# Patient Record
Sex: FEMALE | Race: WHITE | Employment: FULL TIME | URBAN - METROPOLITAN AREA
[De-identification: names, ages, dates, MRNs, and addresses within clinical notes are randomized per-mention and may not be internally consistent; named-entity substitution may affect disease eponyms.]

---

## 2017-06-05 ENCOUNTER — TELEPHONE (OUTPATIENT)
Dept: OBGYN CLINIC | Facility: CLINIC | Age: 32
End: 2017-06-05

## 2017-06-05 NOTE — TELEPHONE ENCOUNTER
SUSAN please call pt to assist with scheduling next annual appt. Last annual was 4/11/16. Once appt is scheduled send to nurses to address refill. Thanks.

## 2017-06-06 RX ORDER — NORGESTIMATE-ETHINYL ESTRADIOL 7DAYSX3 28
TABLET ORAL
Qty: 84 TABLET | Refills: 0 | Status: SHIPPED | OUTPATIENT
Start: 2017-06-06 | End: 2017-07-20 | Stop reason: ALTCHOICE

## 2017-06-20 NOTE — TELEPHONE ENCOUNTER
Pt called in stating she is leaving early tomorrow morning for vacation and realized she will not have enough of this medication on her trip. Pt is asking if she can have a refill today for this medication, please?

## 2017-06-22 RX ORDER — ESCITALOPRAM OXALATE 10 MG/1
TABLET ORAL
Qty: 90 TABLET | Refills: 0 | Status: SHIPPED | OUTPATIENT
Start: 2017-06-22 | End: 2017-09-24

## 2017-07-20 ENCOUNTER — OFFICE VISIT (OUTPATIENT)
Dept: OBGYN CLINIC | Facility: CLINIC | Age: 32
End: 2017-07-20

## 2017-07-20 VITALS
DIASTOLIC BLOOD PRESSURE: 72 MMHG | HEART RATE: 81 BPM | SYSTOLIC BLOOD PRESSURE: 105 MMHG | BODY MASS INDEX: 24 KG/M2 | WEIGHT: 158.81 LBS

## 2017-07-20 DIAGNOSIS — N92.1 IRREGULAR INTERMENSTRUAL BLEEDING: Primary | ICD-10-CM

## 2017-07-20 DIAGNOSIS — Z30.09 ENCOUNTER FOR COUNSELING REGARDING CONTRACEPTION: ICD-10-CM

## 2017-07-20 PROCEDURE — 99213 OFFICE O/P EST LOW 20 MIN: CPT | Performed by: CLINICAL NURSE SPECIALIST

## 2017-07-24 ENCOUNTER — TELEPHONE (OUTPATIENT)
Dept: FAMILY MEDICINE CLINIC | Facility: CLINIC | Age: 32
End: 2017-07-24

## 2017-07-24 NOTE — TELEPHONE ENCOUNTER
Actions Requested: Patient requested appointment today--no same day sick--declined other providers. May we use same day sick or MD approval? Next day next Monday 7/31/17 per patient availability.   Problem: faiting  Onset and Timing: Saturday 7/22/17 x 3, n congestive heart failure  * Occurred during exercise  * Any head or face injury  * Age > 50 years  * Drinking very little and has signs of dehydration (e.g., no urine > 12 hours, very dry mouth)  * Fainted 2 times in one day  * Patient sounds very sick or

## 2017-07-24 NOTE — PROGRESS NOTES
Jordana Pan is a 36year old female  No LMP recorded. Patient presents with:  Gyn Exam: Spotting started 3 months ago  Here with c/o spotting/bleeding the week before period for the last 3 months. Denies any missed or late pills.  Has been on same OC Narrative   None on file       MEDICATIONS:    Current Outpatient Prescriptions:   •  norgestrel-ethinyl estradiol 0.3-30 MG-MCG Oral Tab, Take 1 tablet by mouth daily. , Disp: 3 Package, Rfl: 0  •  ESCITALOPRAM 10 MG Oral Tab, TAKE 1 TABLET(10 MG) BY MOUTH mouth daily. Encounter for counseling regarding contraception  -     norgestrel-ethinyl estradiol 0.3-30 MG-MCG Oral Tab; Take 1 tablet by mouth daily.           Signed Prescriptions Disp Refills    norgestrel-ethinyl estradiol 0.3-30 MG-MCG Oral Tab 3 P

## 2017-07-31 ENCOUNTER — OFFICE VISIT (OUTPATIENT)
Dept: INTERNAL MEDICINE CLINIC | Facility: CLINIC | Age: 32
End: 2017-07-31

## 2017-07-31 VITALS
BODY MASS INDEX: 24 KG/M2 | WEIGHT: 159 LBS | RESPIRATION RATE: 16 BRPM | HEART RATE: 73 BPM | SYSTOLIC BLOOD PRESSURE: 116 MMHG | DIASTOLIC BLOOD PRESSURE: 77 MMHG

## 2017-07-31 DIAGNOSIS — R55 SYNCOPE, UNSPECIFIED SYNCOPE TYPE: Primary | ICD-10-CM

## 2017-07-31 PROCEDURE — 99212 OFFICE O/P EST SF 10 MIN: CPT | Performed by: INTERNAL MEDICINE

## 2017-07-31 PROCEDURE — 99214 OFFICE O/P EST MOD 30 MIN: CPT | Performed by: INTERNAL MEDICINE

## 2017-07-31 RX ORDER — DEXTROAMPHETAMINE SACCHARATE, AMPHETAMINE ASPARTATE MONOHYDRATE, DEXTROAMPHETAMINE SULFATE AND AMPHETAMINE SULFATE 3.75; 3.75; 3.75; 3.75 MG/1; MG/1; MG/1; MG/1
15 CAPSULE, EXTENDED RELEASE ORAL DAILY
Qty: 30 CAPSULE | Refills: 0 | Status: SHIPPED | OUTPATIENT
Start: 2017-08-30 | End: 2017-12-13

## 2017-07-31 RX ORDER — DEXTROAMPHETAMINE SACCHARATE, AMPHETAMINE ASPARTATE MONOHYDRATE, DEXTROAMPHETAMINE SULFATE AND AMPHETAMINE SULFATE 3.75; 3.75; 3.75; 3.75 MG/1; MG/1; MG/1; MG/1
15 CAPSULE, EXTENDED RELEASE ORAL DAILY
Qty: 30 CAPSULE | Refills: 0 | Status: SHIPPED | OUTPATIENT
Start: 2017-07-31 | End: 2017-12-13

## 2017-07-31 RX ORDER — DEXTROAMPHETAMINE SACCHARATE, AMPHETAMINE ASPARTATE MONOHYDRATE, DEXTROAMPHETAMINE SULFATE AND AMPHETAMINE SULFATE 3.75; 3.75; 3.75; 3.75 MG/1; MG/1; MG/1; MG/1
15 CAPSULE, EXTENDED RELEASE ORAL DAILY
Qty: 30 CAPSULE | Refills: 0 | Status: SHIPPED | OUTPATIENT
Start: 2017-09-29 | End: 2017-12-13

## 2017-08-03 ENCOUNTER — LAB ENCOUNTER (OUTPATIENT)
Dept: LAB | Facility: HOSPITAL | Age: 32
End: 2017-08-03
Attending: INTERNAL MEDICINE
Payer: COMMERCIAL

## 2017-08-03 DIAGNOSIS — R55 SYNCOPE, UNSPECIFIED SYNCOPE TYPE: ICD-10-CM

## 2017-08-03 LAB
ALBUMIN SERPL BCP-MCNC: 3.9 G/DL (ref 3.5–4.8)
ALBUMIN/GLOB SERPL: 1.3 {RATIO} (ref 1–2)
ALP SERPL-CCNC: 50 U/L (ref 32–100)
ALT SERPL-CCNC: 26 U/L (ref 14–54)
ANION GAP SERPL CALC-SCNC: 8 MMOL/L (ref 0–18)
AST SERPL-CCNC: 23 U/L (ref 15–41)
BASOPHILS # BLD: 0 K/UL (ref 0–0.2)
BASOPHILS NFR BLD: 0 %
BILIRUB SERPL-MCNC: 0.8 MG/DL (ref 0.3–1.2)
BILIRUB UR QL: NEGATIVE
BUN SERPL-MCNC: 9 MG/DL (ref 8–20)
BUN/CREAT SERPL: 12.5 (ref 10–20)
CALCIUM SERPL-MCNC: 9.3 MG/DL (ref 8.5–10.5)
CHLORIDE SERPL-SCNC: 105 MMOL/L (ref 95–110)
CHOLEST SERPL-MCNC: 173 MG/DL (ref 110–200)
CO2 SERPL-SCNC: 24 MMOL/L (ref 22–32)
COLOR UR: YELLOW
CREAT SERPL-MCNC: 0.72 MG/DL (ref 0.5–1.5)
EOSINOPHIL # BLD: 0.2 K/UL (ref 0–0.7)
EOSINOPHIL NFR BLD: 2 %
ERYTHROCYTE [DISTWIDTH] IN BLOOD BY AUTOMATED COUNT: 13.6 % (ref 11–15)
GLOBULIN PLAS-MCNC: 2.9 G/DL (ref 2.5–3.7)
GLUCOSE SERPL-MCNC: 88 MG/DL (ref 70–99)
GLUCOSE UR-MCNC: NEGATIVE MG/DL
HCT VFR BLD AUTO: 41.5 % (ref 35–48)
HDLC SERPL-MCNC: 48 MG/DL
HGB BLD-MCNC: 13.9 G/DL (ref 12–16)
HGB UR QL STRIP.AUTO: NEGATIVE
LDLC SERPL CALC-MCNC: 104 MG/DL (ref 0–99)
LYMPHOCYTES # BLD: 1.9 K/UL (ref 1–4)
LYMPHOCYTES NFR BLD: 23 %
MCH RBC QN AUTO: 29.2 PG (ref 27–32)
MCHC RBC AUTO-ENTMCNC: 33.5 G/DL (ref 32–37)
MCV RBC AUTO: 87 FL (ref 80–100)
MONOCYTES # BLD: 0.7 K/UL (ref 0–1)
MONOCYTES NFR BLD: 8 %
NEUTROPHILS # BLD AUTO: 5.5 K/UL (ref 1.8–7.7)
NEUTROPHILS NFR BLD: 66 %
NITRITE UR QL STRIP.AUTO: NEGATIVE
NONHDLC SERPL-MCNC: 125 MG/DL
OSMOLALITY UR CALC.SUM OF ELEC: 282 MOSM/KG (ref 275–295)
PH UR: 6 [PH] (ref 5–8)
PLATELET # BLD AUTO: 217 K/UL (ref 140–400)
PMV BLD AUTO: 9.4 FL (ref 7.4–10.3)
POTASSIUM SERPL-SCNC: 3.3 MMOL/L (ref 3.3–5.1)
PROT SERPL-MCNC: 6.8 G/DL (ref 5.9–8.4)
PROT UR-MCNC: NEGATIVE MG/DL
RBC # BLD AUTO: 4.77 M/UL (ref 3.7–5.4)
RBC #/AREA URNS AUTO: 1 /HPF
SODIUM SERPL-SCNC: 137 MMOL/L (ref 136–144)
SP GR UR STRIP: 1.01 (ref 1–1.03)
TRIGL SERPL-MCNC: 107 MG/DL (ref 1–149)
TSH SERPL-ACNC: 2.24 UIU/ML (ref 0.45–5.33)
UROBILINOGEN UR STRIP-ACNC: <2
VIT C UR-MCNC: NEGATIVE MG/DL
WBC # BLD AUTO: 8.2 K/UL (ref 4–11)
WBC #/AREA URNS AUTO: 11 /HPF

## 2017-08-03 PROCEDURE — 80061 LIPID PANEL: CPT

## 2017-08-03 PROCEDURE — 85025 COMPLETE CBC W/AUTO DIFF WBC: CPT

## 2017-08-03 PROCEDURE — 80053 COMPREHEN METABOLIC PANEL: CPT

## 2017-08-03 PROCEDURE — 81001 URINALYSIS AUTO W/SCOPE: CPT

## 2017-08-03 PROCEDURE — 36415 COLL VENOUS BLD VENIPUNCTURE: CPT

## 2017-08-03 PROCEDURE — 84443 ASSAY THYROID STIM HORMONE: CPT

## 2017-08-09 ENCOUNTER — OFFICE VISIT (OUTPATIENT)
Dept: OBGYN CLINIC | Facility: CLINIC | Age: 32
End: 2017-08-09

## 2017-08-09 VITALS
WEIGHT: 155 LBS | BODY MASS INDEX: 24 KG/M2 | HEART RATE: 68 BPM | DIASTOLIC BLOOD PRESSURE: 66 MMHG | SYSTOLIC BLOOD PRESSURE: 94 MMHG

## 2017-08-09 DIAGNOSIS — Z11.3 SCREENING FOR STD (SEXUALLY TRANSMITTED DISEASE): ICD-10-CM

## 2017-08-09 DIAGNOSIS — Z76.0 MEDICATION REFILL: ICD-10-CM

## 2017-08-09 DIAGNOSIS — N88.8 FRIABLE CERVIX: ICD-10-CM

## 2017-08-09 DIAGNOSIS — Z01.419 WELL WOMAN EXAM WITH ROUTINE GYNECOLOGICAL EXAM: Primary | ICD-10-CM

## 2017-08-09 DIAGNOSIS — Z12.4 SCREENING FOR MALIGNANT NEOPLASM OF CERVIX: ICD-10-CM

## 2017-08-09 PROCEDURE — 99395 PREV VISIT EST AGE 18-39: CPT | Performed by: CLINICAL NURSE SPECIALIST

## 2017-08-11 LAB
C TRACH DNA SPEC QL NAA+PROBE: NEGATIVE
N GONORRHOEA DNA SPEC QL NAA+PROBE: NEGATIVE
T VAGINALIS RRNA SPEC QL NAA+PROBE: NEGATIVE

## 2017-08-12 ENCOUNTER — TELEPHONE (OUTPATIENT)
Dept: INTERNAL MEDICINE CLINIC | Facility: CLINIC | Age: 32
End: 2017-08-12

## 2017-08-14 NOTE — PROGRESS NOTES
Idalmis Carrasco is a 36year old female [facility] Patient's last menstrual period was 07/23/2017. Patient presents with:  Gyn Exam: Annual  CAP pt. Last annual was 4/2016.  I saw this pt last month for irregular bleeding the week before period on OCP and marylu chapa norgestrel-ethinyl estradiol 0.3-30 MG-MCG Oral Tab, Take 1 tablet by mouth daily. , Disp: 3 Package, Rfl: 0  •  ClonazePAM 0.5 MG Oral Tab, Take 1 tablet (0.5 mg total) by mouth nightly as needed for Anxiety. , Disp: 30 tablet, Rfl: 0  •  Amphetamine-Dextro skin changes  Abdomen:  soft, nontender, nondistended, no masses  Skin/Hair: no unusual rashes or bruises  Extremities: no edema, no cyanosis  Psychiatric:  Oriented to time, place, person and situation.  Appropriate mood and affect    Pelvic Exam:  Externa

## 2017-08-15 NOTE — TELEPHONE ENCOUNTER
No cause for syncope seen in labs but abnormalities if they were seen could explain why syncope could occur. Most often causes for syncope are not found and proper hydration is advised but blood work is necessary to check out.  If syncope recurs cardiac mandy

## 2017-08-15 NOTE — TELEPHONE ENCOUNTER
Patient informed of results (identified name and ) and recommendations, as per Dr. Joelle Goldsmith result note copied below. Pt denies urinary symptoms.  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

## 2017-09-27 RX ORDER — ESCITALOPRAM OXALATE 10 MG/1
TABLET ORAL
Qty: 90 TABLET | Refills: 0 | Status: SHIPPED | OUTPATIENT
Start: 2017-09-27 | End: 2017-12-27

## 2017-09-28 NOTE — TELEPHONE ENCOUNTER
Refill Protocol Appointment Criteria  · Appointment scheduled in the past 12 months or in the next 3 months  Recent Outpatient Visits            1 month ago Well woman exam with routine gynecological exam    [facility], [address], [place]

## 2017-10-09 DIAGNOSIS — N92.1 IRREGULAR INTERMENSTRUAL BLEEDING: ICD-10-CM

## 2017-10-09 DIAGNOSIS — Z30.09 ENCOUNTER FOR COUNSELING REGARDING CONTRACEPTION: ICD-10-CM

## 2017-10-10 ENCOUNTER — TELEPHONE (OUTPATIENT)
Dept: OBGYN CLINIC | Facility: CLINIC | Age: 32
End: 2017-10-10

## 2017-10-10 RX ORDER — NORGESTREL AND ETHINYL ESTRADIOL 0.3-0.03MG
1 KIT ORAL DAILY
Qty: 84 TABLET | Refills: 3 | Status: SHIPPED | OUTPATIENT
Start: 2017-10-10 | End: 2018-09-04

## 2017-10-10 NOTE — TELEPHONE ENCOUNTER
Pharmacy said pt needed approval for refill on her birth control. Already missed 2 days of her birth control. Pt stated its ok to leave a detailed voice message.

## 2017-10-10 NOTE — TELEPHONE ENCOUNTER
Tasked to CAP on call to approve rx. Pt was seen by MAF on 8/9/17 for annual but did not get rx refilled. Med pended. Pap negative 8/9/17.

## 2017-12-13 NOTE — TELEPHONE ENCOUNTER
LOV: 7/31/17  Last Rx: 9/29/17    Please advise in regards to refill request. Thank You    Please respond to pool: [facility] LPN/JEFFREY

## 2017-12-13 NOTE — TELEPHONE ENCOUNTER
Pt requesting refill for Amphetamine-Dextroamphet ER (ADDERALL XR) 15 MG Oral Capsule SR 24 Hr    Pt is currently out of medication

## 2017-12-14 RX ORDER — DEXTROAMPHETAMINE SACCHARATE, AMPHETAMINE ASPARTATE MONOHYDRATE, DEXTROAMPHETAMINE SULFATE AND AMPHETAMINE SULFATE 3.75; 3.75; 3.75; 3.75 MG/1; MG/1; MG/1; MG/1
15 CAPSULE, EXTENDED RELEASE ORAL DAILY
Qty: 30 CAPSULE | Refills: 0 | Status: SHIPPED | OUTPATIENT
Start: 2017-12-14 | End: 2018-01-13

## 2017-12-14 RX ORDER — DEXTROAMPHETAMINE SACCHARATE, AMPHETAMINE ASPARTATE MONOHYDRATE, DEXTROAMPHETAMINE SULFATE AND AMPHETAMINE SULFATE 3.75; 3.75; 3.75; 3.75 MG/1; MG/1; MG/1; MG/1
15 CAPSULE, EXTENDED RELEASE ORAL DAILY
Qty: 30 CAPSULE | Refills: 0 | Status: SHIPPED | OUTPATIENT
Start: 2018-01-13 | End: 2018-02-12

## 2017-12-14 RX ORDER — DEXTROAMPHETAMINE SACCHARATE, AMPHETAMINE ASPARTATE MONOHYDRATE, DEXTROAMPHETAMINE SULFATE AND AMPHETAMINE SULFATE 3.75; 3.75; 3.75; 3.75 MG/1; MG/1; MG/1; MG/1
15 CAPSULE, EXTENDED RELEASE ORAL DAILY
Qty: 30 CAPSULE | Refills: 0 | Status: SHIPPED | OUTPATIENT
Start: 2018-02-13 | End: 2018-03-15

## 2017-12-14 NOTE — TELEPHONE ENCOUNTER
If patient returns call please let her know that script is ready for  at the [facility] OF THE [facility] desk

## 2017-12-29 RX ORDER — ESCITALOPRAM OXALATE 10 MG/1
TABLET ORAL
Qty: 90 TABLET | Refills: 0 | Status: SHIPPED | OUTPATIENT
Start: 2017-12-29 | End: 2018-07-16

## 2018-02-18 ENCOUNTER — HOSPITAL ENCOUNTER (OUTPATIENT)
Age: 33
Discharge: HOME | End: 2018-02-18
Attending: FAMILY MEDICINE
Payer: COMMERCIAL

## 2018-02-18 VITALS
OXYGEN SATURATION: 100 % | SYSTOLIC BLOOD PRESSURE: 122 MMHG | TEMPERATURE: 99 F | HEIGHT: 64 IN | BODY MASS INDEX: 23.05 KG/M2 | HEART RATE: 115 BPM | DIASTOLIC BLOOD PRESSURE: 64 MMHG | RESPIRATION RATE: 20 BRPM | WEIGHT: 135 LBS

## 2018-02-18 DIAGNOSIS — J20.9 ACUTE BRONCHITIS, UNSPECIFIED ORGANISM: Primary | ICD-10-CM

## 2018-02-18 LAB — S PYO AG THROAT QL: NEGATIVE

## 2018-02-18 PROCEDURE — 99213 OFFICE O/P EST LOW 20 MIN: CPT

## 2018-02-18 PROCEDURE — 99202 OFFICE O/P NEW SF 15 MIN: CPT

## 2018-02-18 PROCEDURE — 87430 STREP A AG IA: CPT

## 2018-02-18 RX ORDER — PREDNISONE 20 MG/1
40 TABLET ORAL DAILY
Qty: 10 TABLET | Refills: 0 | Status: SHIPPED | OUTPATIENT
Start: 2018-02-18 | End: 2018-02-23

## 2018-02-18 RX ORDER — PREDNISONE 20 MG/1
40 TABLET ORAL DAILY
Qty: 10 TABLET | Refills: 0 | Status: SHIPPED | OUTPATIENT
Start: 2018-02-18 | End: 2018-02-18

## 2018-02-18 RX ORDER — ALBUTEROL SULFATE 90 UG/1
2 AEROSOL, METERED RESPIRATORY (INHALATION) EVERY 4 HOURS PRN
Qty: 1 INHALER | Refills: 0 | Status: SHIPPED | OUTPATIENT
Start: 2018-02-18 | End: 2018-02-25

## 2018-02-18 NOTE — ED PROVIDER NOTES
Patient presents with:  Cough/URI    HPI:     John Crenshaw is a 29year old female who presents with for chief complaint of fevers, chills, chest congestion, cough, headaches and body aches. Onset of symptoms was 3 days  Since he is she notes wheeze.    The reviewed and negative except as noted above. PSFH elements reviewed from today and agreed except as otherwise stated in HPI.         Physical Exam:     Findings:    /64   Pulse 115   Temp 98.6 °F (37 °C) (Oral)   Resp 20   Ht 162.6 cm (5' 4\")   Wt relief  Monitor for worsening symptoms   Follow up with pcp if not better in 3-4 days or as needed    All results reviewed and discussed with patient. See AVS for detailed discharge instructions for your condition today.     Follow Up with:  Jaydon Schwartz

## 2018-02-18 NOTE — ED INITIAL ASSESSMENT (HPI)
Patient comes in with upper respiratory symptoms, cough, congestion, fever as high as 100.3, hoarse throat, body aches since Friday. Afebrile today.

## 2018-04-03 ENCOUNTER — TELEPHONE (OUTPATIENT)
Dept: INTERNAL MEDICINE CLINIC | Facility: CLINIC | Age: 33
End: 2018-04-03

## 2018-04-03 NOTE — TELEPHONE ENCOUNTER
Pt called stating she is leaving the country and going to [facility] and [facility]   Pt needs vaccines prior to leaving   She knows she needs hepatis A and typhoid   Pt does not know if she needs any other ones

## 2018-04-08 RX ORDER — DEXTROAMPHETAMINE SACCHARATE, AMPHETAMINE ASPARTATE MONOHYDRATE, DEXTROAMPHETAMINE SULFATE AND AMPHETAMINE SULFATE 3.75; 3.75; 3.75; 3.75 MG/1; MG/1; MG/1; MG/1
15 CAPSULE, EXTENDED RELEASE ORAL DAILY
Qty: 30 CAPSULE | Refills: 0 | Status: SHIPPED | OUTPATIENT
Start: 2018-05-07 | End: 2018-08-30

## 2018-04-08 RX ORDER — DEXTROAMPHETAMINE SACCHARATE, AMPHETAMINE ASPARTATE MONOHYDRATE, DEXTROAMPHETAMINE SULFATE AND AMPHETAMINE SULFATE 3.75; 3.75; 3.75; 3.75 MG/1; MG/1; MG/1; MG/1
15 CAPSULE, EXTENDED RELEASE ORAL DAILY
Qty: 30 CAPSULE | Refills: 0 | Status: SHIPPED | OUTPATIENT
Start: 2018-06-06 | End: 2018-08-30

## 2018-04-08 RX ORDER — DEXTROAMPHETAMINE SACCHARATE, AMPHETAMINE ASPARTATE MONOHYDRATE, DEXTROAMPHETAMINE SULFATE AND AMPHETAMINE SULFATE 3.75; 3.75; 3.75; 3.75 MG/1; MG/1; MG/1; MG/1
15 CAPSULE, EXTENDED RELEASE ORAL DAILY
Qty: 30 CAPSULE | Refills: 0 | Status: SHIPPED | OUTPATIENT
Start: 2018-04-08 | End: 2018-08-30

## 2018-05-22 ENCOUNTER — TELEPHONE (OUTPATIENT)
Dept: INTERNAL MEDICINE CLINIC | Facility: CLINIC | Age: 33
End: 2018-05-22

## 2018-05-29 ENCOUNTER — NURSE ONLY (OUTPATIENT)
Dept: INTERNAL MEDICINE CLINIC | Facility: CLINIC | Age: 33
End: 2018-05-29

## 2018-05-29 ENCOUNTER — TELEPHONE (OUTPATIENT)
Dept: OTHER | Age: 33
End: 2018-05-29

## 2018-05-29 DIAGNOSIS — Z00.00 PREVENTATIVE HEALTH CARE: ICD-10-CM

## 2018-05-29 PROCEDURE — 90471 IMMUNIZATION ADMIN: CPT | Performed by: INTERNAL MEDICINE

## 2018-05-29 PROCEDURE — 90632 HEPA VACCINE ADULT IM: CPT | Performed by: INTERNAL MEDICINE

## 2018-05-29 NOTE — TELEPHONE ENCOUNTER
Pt states she went to travel clinic for recommendations for immunizations for travel and was advised she may need tetanus vaccine, wanted to know when she last received vaccine. Reviewed date of last TDaP, Aug 2014 with pt.  She will come today for Hep A as

## 2018-07-16 RX ORDER — ESCITALOPRAM OXALATE 10 MG/1
TABLET ORAL
Qty: 90 TABLET | Refills: 0 | Status: SHIPPED | OUTPATIENT
Start: 2018-07-16 | End: 2018-10-14

## 2018-08-02 DIAGNOSIS — N92.1 IRREGULAR INTERMENSTRUAL BLEEDING: ICD-10-CM

## 2018-08-02 DIAGNOSIS — Z30.09 ENCOUNTER FOR COUNSELING REGARDING CONTRACEPTION: ICD-10-CM

## 2018-08-02 RX ORDER — NORGESTREL AND ETHINYL ESTRADIOL 0.3-0.03MG
1 KIT ORAL DAILY
Qty: 84 TABLET | Refills: 0 | OUTPATIENT
Start: 2018-08-02

## 2018-08-29 ENCOUNTER — NURSE TRIAGE (OUTPATIENT)
Dept: OTHER | Age: 33
End: 2018-08-29

## 2018-08-29 NOTE — TELEPHONE ENCOUNTER
Action Requested: Summary for Provider     []  Critical Lab, Recommendations Needed  [] Need Additional Advice  []   FYI    []   Need Orders  [] Need Medications Sent to Pharmacy  []  Other     SUMMARY: appt scheduled with MISTY BETANCOURT for pain top of rig

## 2018-08-30 NOTE — TELEPHONE ENCOUNTER
NO PROTOCOL, 90 day panel pended for approval.  Office staff=please call patient once scripts are ready for .

## 2018-08-30 NOTE — TELEPHONE ENCOUNTER
Pt called in requesting a refill for the following medication:  Please advise       Amphetamine-Dextroamphet ER (ADDERALL XR) 15 MG Oral Capsule SR 24 Hr 30 capsule 0 6/6/2018 7/6/2018   Sig :  Take 1 capsule (15 mg total) by mouth daily.      Route:   Oral

## 2018-09-04 ENCOUNTER — TELEPHONE (OUTPATIENT)
Dept: OBGYN CLINIC | Facility: CLINIC | Age: 33
End: 2018-09-04

## 2018-09-04 DIAGNOSIS — Z30.09 ENCOUNTER FOR COUNSELING REGARDING CONTRACEPTION: ICD-10-CM

## 2018-09-04 DIAGNOSIS — N92.1 IRREGULAR INTERMENSTRUAL BLEEDING: ICD-10-CM

## 2018-09-04 NOTE — TELEPHONE ENCOUNTER
Pharmacy calling to report that pt needs refill on OCP. Explained to pharmacist that pt is due for her annual and pt needs to call office to set up appt before refill can be addressed.

## 2018-09-05 RX ORDER — NORGESTREL AND ETHINYL ESTRADIOL 0.3-0.03MG
1 KIT ORAL DAILY
Qty: 84 TABLET | Refills: 0 | Status: SHIPPED | OUTPATIENT
Start: 2018-09-05 | End: 2018-09-27

## 2018-09-05 RX ORDER — DEXTROAMPHETAMINE SACCHARATE, AMPHETAMINE ASPARTATE MONOHYDRATE, DEXTROAMPHETAMINE SULFATE AND AMPHETAMINE SULFATE 3.75; 3.75; 3.75; 3.75 MG/1; MG/1; MG/1; MG/1
15 CAPSULE, EXTENDED RELEASE ORAL DAILY
Qty: 30 CAPSULE | Refills: 0 | Status: SHIPPED | OUTPATIENT
Start: 2018-09-05 | End: 2018-10-05

## 2018-09-05 RX ORDER — DEXTROAMPHETAMINE SACCHARATE, AMPHETAMINE ASPARTATE MONOHYDRATE, DEXTROAMPHETAMINE SULFATE AND AMPHETAMINE SULFATE 3.75; 3.75; 3.75; 3.75 MG/1; MG/1; MG/1; MG/1
15 CAPSULE, EXTENDED RELEASE ORAL DAILY
Qty: 30 CAPSULE | Refills: 0 | Status: SHIPPED | OUTPATIENT
Start: 2018-10-30 | End: 2018-09-27

## 2018-09-05 RX ORDER — DEXTROAMPHETAMINE SACCHARATE, AMPHETAMINE ASPARTATE MONOHYDRATE, DEXTROAMPHETAMINE SULFATE AND AMPHETAMINE SULFATE 3.75; 3.75; 3.75; 3.75 MG/1; MG/1; MG/1; MG/1
15 CAPSULE, EXTENDED RELEASE ORAL DAILY
Qty: 30 CAPSULE | Refills: 0 | Status: SHIPPED | OUTPATIENT
Start: 2018-09-30 | End: 2018-09-27

## 2018-09-05 NOTE — TELEPHONE ENCOUNTER
LAST ANNUAL 8-9-17 (PAP NORMAL) AND NEXT ANNUAL SCHEDULED FOR 9-19-18. PT NOTIFIED AUTHORIZATION FOR 3 PACKS SENT TO THE PHARMACY PER PROTOCOL.

## 2018-09-12 DIAGNOSIS — N92.1 IRREGULAR INTERMENSTRUAL BLEEDING: ICD-10-CM

## 2018-09-12 DIAGNOSIS — Z30.09 ENCOUNTER FOR COUNSELING REGARDING CONTRACEPTION: ICD-10-CM

## 2018-09-13 ENCOUNTER — TELEPHONE (OUTPATIENT)
Dept: OBGYN CLINIC | Facility: CLINIC | Age: 33
End: 2018-09-13

## 2018-09-13 RX ORDER — NORGESTREL AND ETHINYL ESTRADIOL 0.3-0.03MG
1 KIT ORAL DAILY
Qty: 84 TABLET | Refills: 0 | OUTPATIENT
Start: 2018-09-13

## 2018-09-13 NOTE — TELEPHONE ENCOUNTER
Pharm did not receive electronic fax of Rx on 9/5 for birth control. Pt states that she is very behind. Pt. Requesting to get Rx sent to her [facility].

## 2018-09-13 NOTE — TELEPHONE ENCOUNTER
SENT BACK RX DENIED, RESPONDED TO BY OTHER MEANS AND NOTED THE RX SENT TO THEIR STORE ON 9-5-18 FOR 1 REFILL.

## 2018-09-14 DIAGNOSIS — N92.1 IRREGULAR INTERMENSTRUAL BLEEDING: ICD-10-CM

## 2018-09-14 DIAGNOSIS — Z30.09 ENCOUNTER FOR COUNSELING REGARDING CONTRACEPTION: ICD-10-CM

## 2018-09-14 NOTE — TELEPHONE ENCOUNTER
Gómeztclisa. Called pts [facility] pharmacy that Low-ogestrel was sent to and spoke with Halle. Halle stated they never received a RX. Verbal given to Halle for pts low-ogestrel for 3 packs no refills.

## 2018-09-14 NOTE — TELEPHONE ENCOUNTER
Lmtcb to notify pt OCP rx for 90 day supply was sent to pt's pharmacy on 9/5/18 and should cover pt to appt on 9/19/18.

## 2018-09-18 DIAGNOSIS — Z30.09 ENCOUNTER FOR COUNSELING REGARDING CONTRACEPTION: ICD-10-CM

## 2018-09-18 DIAGNOSIS — N92.1 IRREGULAR INTERMENSTRUAL BLEEDING: ICD-10-CM

## 2018-09-18 RX ORDER — NORGESTREL AND ETHINYL ESTRADIOL 0.3-0.03MG
1 KIT ORAL DAILY
Qty: 84 TABLET | Refills: 0 | OUTPATIENT
Start: 2018-09-18

## 2018-09-18 NOTE — TELEPHONE ENCOUNTER
SEE 9-14-18 PHONE ENCOUNTER. SENT BACK RX DENIED. REFILL AUTHORIZATION WAS SENT TO YOUR PHARMACY ON 9-5-18.

## 2018-09-27 ENCOUNTER — OFFICE VISIT (OUTPATIENT)
Dept: OBGYN CLINIC | Facility: CLINIC | Age: 33
End: 2018-09-27

## 2018-09-27 VITALS
HEART RATE: 69 BPM | WEIGHT: 149.81 LBS | BODY MASS INDEX: 26 KG/M2 | SYSTOLIC BLOOD PRESSURE: 115 MMHG | DIASTOLIC BLOOD PRESSURE: 76 MMHG

## 2018-09-27 DIAGNOSIS — Z01.419 WELL WOMAN EXAM WITH ROUTINE GYNECOLOGICAL EXAM: Primary | ICD-10-CM

## 2018-09-27 DIAGNOSIS — Z76.0 MEDICATION REFILL: ICD-10-CM

## 2018-09-27 PROCEDURE — 99395 PREV VISIT EST AGE 18-39: CPT | Performed by: CLINICAL NURSE SPECIALIST

## 2018-09-27 NOTE — PROGRESS NOTES
Kailash Alanis is a 34year old female [facility] Patient's last menstrual period was 09/27/2018. Patient presents with:  Gyn Exam: Annual  Refill Request: B/C  Last annual exam and pap was 8/9/17. Pap was normal. Due for co-testing next year.  Hx of LEEP in 2008 x 12 years    Other Topics      Concerns:         Service: Not Asked        Blood Transfusions: Not Asked          coffee, 1 cup/day - soda, occasionally        Caffeine Concern: Yes        Occupational Exposure: Not Asked        Hobby Hazards: Not Neurological:  denies headaches, extremity weakness or numbness. Psychiatric: denies depression or anxiety. Endocrine:   denies excessive thirst or urination. Heme/Lymph:  denies history of anemia, easy bruising or bleeding.       PHYSICAL EXAM:   Cons

## 2018-10-04 NOTE — PROGRESS NOTES
Edwin Ontiveros is a 36year old female. HPI:   1. Syncope, unspecified syncope type    Had a syncopal spell and slumped to ground and scaped knees wfile with friends.  [place] herslef falling and no loss of urine, tongue biting or observed tonic clonoc movements a week ago after walking a mile w/o drinking water. Suspect some contribution of dehydration. No evidence of seizure activity. Unlikely to be arrhythmia w/o serious injury on falling but hypotension likely.  Check blood work and see if any cause for syncope No vertebral tenderness/No scoliosis

## 2018-10-15 RX ORDER — ESCITALOPRAM OXALATE 10 MG/1
TABLET ORAL
Qty: 90 TABLET | Refills: 0 | Status: SHIPPED | OUTPATIENT
Start: 2018-10-15 | End: 2019-01-10

## 2019-01-10 RX ORDER — ESCITALOPRAM OXALATE 10 MG/1
TABLET ORAL
Qty: 90 TABLET | Refills: 0 | Status: SHIPPED | OUTPATIENT
Start: 2019-01-10 | End: 2019-04-28

## 2019-01-23 NOTE — TELEPHONE ENCOUNTER
Pt called in requesting refill on medication below. She states that she has 1 more left. Please advise.        Current Outpatient Medications:   •  Amphetamine-Dextroamphet ER (ADDERALL XR) 15 MG Oral Capsule SR 24 Hr

## 2019-01-24 RX ORDER — DEXTROAMPHETAMINE SACCHARATE, AMPHETAMINE ASPARTATE MONOHYDRATE, DEXTROAMPHETAMINE SULFATE AND AMPHETAMINE SULFATE 3.75; 3.75; 3.75; 3.75 MG/1; MG/1; MG/1; MG/1
15 CAPSULE, EXTENDED RELEASE ORAL DAILY
Qty: 30 CAPSULE | Refills: 0 | Status: SHIPPED | OUTPATIENT
Start: 2019-01-24 | End: 2019-02-15

## 2019-01-26 ENCOUNTER — TELEPHONE (OUTPATIENT)
Dept: OTHER | Age: 34
End: 2019-01-26

## 2019-01-26 NOTE — TELEPHONE ENCOUNTER
Pt called and looking for rx ref for adderall. Per EMR rx was printed on 1-24-19 and she states she already received my chart message that its ready for p/u. She just want to know which location she needs to p/u rx.    Triage RN called [facility] and schiller

## 2019-01-28 NOTE — TELEPHONE ENCOUNTER
Patient notified that script is ready for  at the [facility] OF THE [facility]

## 2019-01-28 NOTE — TELEPHONE ENCOUNTER
RX is at [facility] OF THE [facility] internal medicine .  Needs to make appointment in next 30 days

## 2019-02-15 NOTE — PROGRESS NOTES
Bette Torres is a 34year old female. HPI:   1. Attention deficit hyperactivity disorder (ADHD), predominantly inattentive type    Has been taking medicine as advised. Still with trouble with completing tasks and organizational  issues.  Her concentration d unspecified     Clonazepam   • JOSE J III (cervical intraepithelial neoplasia III)    • Depression     Lexapro      Social History:  Social History    Tobacco Use      Smoking status: Never Smoker      Smokeless tobacco: Never Used    Alcohol use: Yes      Co

## 2019-03-26 ENCOUNTER — TELEPHONE (OUTPATIENT)
Dept: INTERNAL MEDICINE CLINIC | Facility: CLINIC | Age: 34
End: 2019-03-26

## 2019-03-26 RX ORDER — DEXTROAMPHETAMINE SACCHARATE, AMPHETAMINE ASPARTATE MONOHYDRATE, DEXTROAMPHETAMINE SULFATE AND AMPHETAMINE SULFATE 3.75; 3.75; 3.75; 3.75 MG/1; MG/1; MG/1; MG/1
15 CAPSULE, EXTENDED RELEASE ORAL DAILY
Qty: 30 CAPSULE | Refills: 0 | Status: SHIPPED | OUTPATIENT
Start: 2019-04-25 | End: 2019-05-25

## 2019-03-26 RX ORDER — DEXTROAMPHETAMINE SACCHARATE, AMPHETAMINE ASPARTATE MONOHYDRATE, DEXTROAMPHETAMINE SULFATE AND AMPHETAMINE SULFATE 3.75; 3.75; 3.75; 3.75 MG/1; MG/1; MG/1; MG/1
15 CAPSULE, EXTENDED RELEASE ORAL DAILY
Qty: 30 CAPSULE | Refills: 0 | Status: SHIPPED | OUTPATIENT
Start: 2019-05-25 | End: 2019-06-24

## 2019-03-26 RX ORDER — DEXTROAMPHETAMINE SACCHARATE, AMPHETAMINE ASPARTATE MONOHYDRATE, DEXTROAMPHETAMINE SULFATE AND AMPHETAMINE SULFATE 3.75; 3.75; 3.75; 3.75 MG/1; MG/1; MG/1; MG/1
15 CAPSULE, EXTENDED RELEASE ORAL DAILY
Qty: 30 CAPSULE | Refills: 0 | Status: SHIPPED | OUTPATIENT
Start: 2019-03-26 | End: 2019-04-25

## 2019-03-26 NOTE — TELEPHONE ENCOUNTER
Pt states she went to refill her medication amphetamine-dextroamphetamine (ADDERALL) 15 MG Oral Tab    But this time is was different per pt she usually gets Adderall ER and this time is was not written as Adderall ER. Pt would like to know if she can go back to the Adderall ER.

## 2019-03-26 NOTE — TELEPHONE ENCOUNTER
Please see email and advise. LOV notes on 2/15/19 show medication as follows:      Will increase  adderall XR 15 mg and see patient back in 3 months

## 2019-03-26 NOTE — TELEPHONE ENCOUNTER
New rx printed and can be picked up. Does she want to  at [facility] or [facility] [place]?

## 2019-04-28 RX ORDER — ESCITALOPRAM OXALATE 10 MG/1
TABLET ORAL
Qty: 90 TABLET | Refills: 1 | Status: SHIPPED | OUTPATIENT
Start: 2019-04-28 | End: 2021-02-17

## 2019-07-31 RX ORDER — DEXTROAMPHETAMINE SACCHARATE, AMPHETAMINE ASPARTATE MONOHYDRATE, DEXTROAMPHETAMINE SULFATE AND AMPHETAMINE SULFATE 3.75; 3.75; 3.75; 3.75 MG/1; MG/1; MG/1; MG/1
15 CAPSULE, EXTENDED RELEASE ORAL DAILY
Qty: 30 CAPSULE | Refills: 0 | Status: CANCELLED | OUTPATIENT
Start: 2019-07-31 | End: 2019-08-30

## 2019-07-31 NOTE — TELEPHONE ENCOUNTER
Controlled medication pending for review. If approved needs to be called in or faxed by on-site staff.     Last Rx: 5/25/19  LOV: 2/15/19    Requested Prescriptions   Pending Prescriptions Disp Refills   • Amphetamine-Dextroamphet ER (ADDERALL XR) 15 MG Or

## 2019-08-01 RX ORDER — DEXTROAMPHETAMINE SACCHARATE, AMPHETAMINE ASPARTATE MONOHYDRATE, DEXTROAMPHETAMINE SULFATE AND AMPHETAMINE SULFATE 3.75; 3.75; 3.75; 3.75 MG/1; MG/1; MG/1; MG/1
15 CAPSULE, EXTENDED RELEASE ORAL DAILY
Qty: 30 CAPSULE | Refills: 0 | Status: SHIPPED | OUTPATIENT
Start: 2019-09-29 | End: 2019-10-29

## 2019-08-01 RX ORDER — DEXTROAMPHETAMINE SACCHARATE, AMPHETAMINE ASPARTATE MONOHYDRATE, DEXTROAMPHETAMINE SULFATE AND AMPHETAMINE SULFATE 3.75; 3.75; 3.75; 3.75 MG/1; MG/1; MG/1; MG/1
15 CAPSULE, EXTENDED RELEASE ORAL DAILY
Qty: 30 CAPSULE | Refills: 0 | Status: SHIPPED | OUTPATIENT
Start: 2019-08-01 | End: 2019-11-20

## 2019-08-01 RX ORDER — DEXTROAMPHETAMINE SACCHARATE, AMPHETAMINE ASPARTATE MONOHYDRATE, DEXTROAMPHETAMINE SULFATE AND AMPHETAMINE SULFATE 3.75; 3.75; 3.75; 3.75 MG/1; MG/1; MG/1; MG/1
15 CAPSULE, EXTENDED RELEASE ORAL DAILY
Qty: 30 CAPSULE | Refills: 0 | Status: SHIPPED | OUTPATIENT
Start: 2019-08-30 | End: 2019-09-29

## 2019-08-01 NOTE — TELEPHONE ENCOUNTER
Ronny will place printed rx at  at [facility], patient contacted aware to  at [place] [address]

## 2019-08-06 NOTE — TELEPHONE ENCOUNTER
Spoke with patient--reports she received [email or web address] message that her Adderall Rx has been denied--wants to clarify. Relayed Cindy's message below and that only duplicate has been denied--patient verbalizes understanding and agreement.  No further questio

## 2019-09-25 ENCOUNTER — TELEPHONE (OUTPATIENT)
Dept: INTERNAL MEDICINE CLINIC | Facility: CLINIC | Age: 34
End: 2019-09-25

## 2019-09-25 DIAGNOSIS — F90.1 ATTENTION DEFICIT HYPERACTIVITY DISORDER (ADHD), PREDOMINANTLY HYPERACTIVE TYPE: ICD-10-CM

## 2019-09-25 DIAGNOSIS — F32.A DEPRESSION, UNSPECIFIED DEPRESSION TYPE: Primary | ICD-10-CM

## 2019-09-25 NOTE — TELEPHONE ENCOUNTER
Per patient the anxiety medication is not working that great for her. She is requesting to see a counselor now if possible? Requesting a referral.     Please advise. Thank you.

## 2019-09-27 NOTE — TELEPHONE ENCOUNTER
Patient calling to check the status on the referral.  She have no one in mind she just trying to get the referral first         Please advise   # [phone]

## 2019-10-01 NOTE — TELEPHONE ENCOUNTER
Dr. Yogi Bingham, patient was last seen on 02/15/2019. Patient has been on Escitalopram which is not working. Requesting a referral for a counselor.  Please advise if ok to generate a referral.

## 2019-10-02 DIAGNOSIS — Z76.0 MEDICATION REFILL: ICD-10-CM

## 2019-10-02 RX ORDER — NORGESTREL AND ETHINYL ESTRADIOL 0.3-0.03MG
KIT ORAL
Qty: 84 TABLET | Refills: 0 | OUTPATIENT
Start: 2019-10-02

## 2019-10-03 DIAGNOSIS — Z76.0 MEDICATION REFILL: ICD-10-CM

## 2019-10-03 RX ORDER — NORGESTREL AND ETHINYL ESTRADIOL 0.3-0.03MG
KIT ORAL
Qty: 84 TABLET | Refills: 0 | OUTPATIENT
Start: 2019-10-03

## 2019-10-03 NOTE — TELEPHONE ENCOUNTER
LM informing pt Rx sent to pharmacy. 1 month OCP sent per protocol.      Last annual= 9/27/18  Next annual= 10/24/19

## 2019-10-03 NOTE — TELEPHONE ENCOUNTER
Patient calling regarding [address] [place] referral, wants to make sure referral covered with insurance. per manage care office patient needs to call [phone]. Called number this is Chhaya Batres number patient may call this number. No paper referral is required.   L

## 2019-10-22 ENCOUNTER — TELEPHONE (OUTPATIENT)
Dept: OBGYN CLINIC | Facility: CLINIC | Age: 34
End: 2019-10-22

## 2019-10-22 NOTE — TELEPHONE ENCOUNTER
Patient unable to come to [facility], so she rescheduled for next evening appt she can book which is schd on 11/14.  Asking if birth control refill can be sent to last until next appt

## 2019-10-22 NOTE — TELEPHONE ENCOUNTER
Pt states that she had to reschedule her appt with [facility]. One rx was sent for birth control previously in 10/2019. Informed will send one more rx and keep her appt with [facility]. Appt 11-14-19. Last Annual 9-12-18, notes state pap & hpv due 2019.

## 2019-11-14 ENCOUNTER — OFFICE VISIT (OUTPATIENT)
Dept: OBGYN CLINIC | Facility: CLINIC | Age: 34
End: 2019-11-14

## 2019-11-14 VITALS
HEART RATE: 73 BPM | WEIGHT: 152.81 LBS | DIASTOLIC BLOOD PRESSURE: 70 MMHG | BODY MASS INDEX: 26 KG/M2 | SYSTOLIC BLOOD PRESSURE: 109 MMHG

## 2019-11-14 DIAGNOSIS — Z98.890 HISTORY OF LOOP ELECTRICAL EXCISION PROCEDURE (LEEP): ICD-10-CM

## 2019-11-14 DIAGNOSIS — Z12.4 CERVICAL CANCER SCREENING: ICD-10-CM

## 2019-11-14 DIAGNOSIS — Z76.0 MEDICATION REFILL: ICD-10-CM

## 2019-11-14 DIAGNOSIS — Z01.419 WELL WOMAN EXAM WITH ROUTINE GYNECOLOGICAL EXAM: Primary | ICD-10-CM

## 2019-11-14 PROCEDURE — 99395 PREV VISIT EST AGE 18-39: CPT | Performed by: CLINICAL NURSE SPECIALIST

## 2019-11-18 NOTE — PROGRESS NOTES
Kristie Trevizo is a 30year old female [place] Patient's last menstrual period was 10/27/2019. Patient presents with:  Gyn Exam: Annual and B/C  Last annual exam was last year. Last pap was 2017 and normal. Will do co-testing today. Hx of LEEP in 2008.  Period connections:        Talks on phone: Not on file        Gets together: Not on file        Attends Qu[employer] service: Not on file        Active member of club or organization: Not on file        Attends meetings of clubs or organizations: Not on file vision  Cardiovascular:  denies chest pain or palpitations  Respiratory:  denies shortness of breath  Gastrointestinal:  denies heartburn, abdominal pain, diarrhea or constipation  Genitourinary:  denies dysuria, incontinence, abnormal vaginal discharge, v exam    Medication refill  -     norgestrel-ethinyl estradiol (LOW-OGESTREL) 0.3-30 MG-MCG Oral Tab; Take 1 tablet by mouth daily.     History of loop electrical excision procedure (LEEP)  -     THINPREP PAP SMEAR B  -     HPV HIGH RISK , THIN PREP COLLECTI

## 2019-11-19 ENCOUNTER — TELEPHONE (OUTPATIENT)
Dept: INTERNAL MEDICINE CLINIC | Facility: CLINIC | Age: 34
End: 2019-11-19

## 2019-11-19 NOTE — TELEPHONE ENCOUNTER
Gen Muñoz from [employer] called to request the medication Adderall for patient    She stated patient advised her that she had to call the medication in with her pharmacy.   Gen Muñoz stated that [employer] does not call in Control Substance

## 2019-11-20 RX ORDER — DEXTROAMPHETAMINE SACCHARATE, AMPHETAMINE ASPARTATE MONOHYDRATE, DEXTROAMPHETAMINE SULFATE AND AMPHETAMINE SULFATE 3.75; 3.75; 3.75; 3.75 MG/1; MG/1; MG/1; MG/1
15 CAPSULE, EXTENDED RELEASE ORAL DAILY
Qty: 30 CAPSULE | Refills: 0 | Status: SHIPPED | OUTPATIENT
Start: 2019-11-20 | End: 2019-11-26

## 2019-11-20 NOTE — TELEPHONE ENCOUNTER
Review pended refill request as it does not fall under a protocol.     Last Rx: 9/29/19  LOV:  2/15/19

## 2019-11-23 RX ORDER — DEXTROAMPHETAMINE SACCHARATE, AMPHETAMINE ASPARTATE MONOHYDRATE, DEXTROAMPHETAMINE SULFATE AND AMPHETAMINE SULFATE 3.75; 3.75; 3.75; 3.75 MG/1; MG/1; MG/1; MG/1
15 CAPSULE, EXTENDED RELEASE ORAL DAILY
Qty: 30 CAPSULE | Refills: 0 | Status: CANCELLED | OUTPATIENT
Start: 2019-11-23 | End: 2019-12-23

## 2019-11-23 NOTE — TELEPHONE ENCOUNTER
Patient is requesting if script for medication Amphetamine-Dextroamphet ER (ADDERALL XR) 15 MG Oral Capsule SR 24 Hr can be called in or faxed to [address] ([address] [facility]), instead of her having to  script at the office.

## 2019-11-26 RX ORDER — DEXTROAMPHETAMINE SACCHARATE, AMPHETAMINE ASPARTATE MONOHYDRATE, DEXTROAMPHETAMINE SULFATE AND AMPHETAMINE SULFATE 3.75; 3.75; 3.75; 3.75 MG/1; MG/1; MG/1; MG/1
15 CAPSULE, EXTENDED RELEASE ORAL DAILY
Qty: 30 CAPSULE | Refills: 0 | Status: SHIPPED | OUTPATIENT
Start: 2019-11-26 | End: 2019-11-29

## 2019-11-26 NOTE — TELEPHONE ENCOUNTER
Dr Gerardo Macario, the ADDERALL rx of 11/20/19 cannot be located, please sign pending rx  Please respond to pool: EM [facility] LPN/CMA

## 2019-11-27 NOTE — TELEPHONE ENCOUNTER
Message left to return call.  If patient returns call please inform her that her script is ready for  at the [facility],   [address] location

## 2019-11-29 RX ORDER — DEXTROAMPHETAMINE SACCHARATE, AMPHETAMINE ASPARTATE MONOHYDRATE, DEXTROAMPHETAMINE SULFATE AND AMPHETAMINE SULFATE 3.75; 3.75; 3.75; 3.75 MG/1; MG/1; MG/1; MG/1
15 CAPSULE, EXTENDED RELEASE ORAL EVERY MORNING
Qty: 30 CAPSULE | Refills: 0 | Status: SHIPPED | OUTPATIENT
Start: 2019-11-29 | End: 2019-12-29

## 2019-11-29 NOTE — TELEPHONE ENCOUNTER
Patient is requesting the rx for Adderall be electronically sent to her pharmacy on file. Patient is upset as she was told it could be electronically sent but has not been and she has been waiting for 3 weeks to get the rx.  Patient states it will take her

## 2019-11-29 NOTE — TELEPHONE ENCOUNTER
Review chart noted that patient was last seen in February by Dr. Annelise Kwok and he had increased the dose and was recommended to come back in 3 months, he repeated this also on the telephone encounter on 3/26/2019  ML SHE WILL NEED APT BUT WILL GIVE THIS ON

## 2019-11-30 NOTE — TELEPHONE ENCOUNTER
Spoke with patient (verified name and , advised that Adderral was already esent last night, patient states that she already picked up the medication.

## 2020-01-16 DIAGNOSIS — Z76.0 MEDICATION REFILL: ICD-10-CM

## 2020-01-16 NOTE — TELEPHONE ENCOUNTER
Pt is requesting a new Rx for adderal XR 15. Please fax with al Rx info (mg, dir, qty, refills)  Thank you! Previously auth by Dr. Maryuri Cohen.       Current Outpatient Medications:   •  norgestrel-ethinyl estradiol (LOW-OGESTREL) 0.3-30 MG-MCG Oral Tab, Sascha Mustafa

## 2020-01-17 RX ORDER — CLONAZEPAM 0.5 MG/1
0.5 TABLET ORAL NIGHTLY PRN
Qty: 30 TABLET | Refills: 0 | OUTPATIENT
Start: 2020-01-17

## 2020-01-17 RX ORDER — DEXTROAMPHETAMINE SACCHARATE, AMPHETAMINE ASPARTATE MONOHYDRATE, DEXTROAMPHETAMINE SULFATE AND AMPHETAMINE SULFATE 3.75; 3.75; 3.75; 3.75 MG/1; MG/1; MG/1; MG/1
15 CAPSULE, EXTENDED RELEASE ORAL EVERY MORNING
Qty: 30 CAPSULE | Refills: 0 | OUTPATIENT
Start: 2020-01-17 | End: 2020-02-16

## 2020-01-17 RX ORDER — ESCITALOPRAM OXALATE 10 MG/1
TABLET ORAL
Qty: 90 TABLET | Refills: 1 | OUTPATIENT
Start: 2020-01-17

## 2020-01-17 NOTE — TELEPHONE ENCOUNTER
Sent to [facility], MILAN out-of-office    Controlled medication pending for review. If approved needs to be called in or faxed by on-site staff.      Last Rx: 11/29/19   LOV: 11 months ago

## 2020-01-17 NOTE — TELEPHONE ENCOUNTER
Please review; protocol failed. Psychiatric Non-Scheduled (Anti-Anxiety) Failed1/17 12:54 PM   Appointment in last 6 or next 3 months     Controlled medication pending for review.   Please change to phone in, fax, or print script if not being sent electron

## 2020-01-17 NOTE — TELEPHONE ENCOUNTER
Patient requesting if rx can be refilled prior to her appointment on 1/28/20    Patient was due for a follow up visit in May, 2019; She has been reminded of this several times. She is now scheduled for 1/28/20.     Please advise on refill of Adderall

## 2020-01-17 NOTE — TELEPHONE ENCOUNTER
Pharmacy called in requesting refill for medication below. States that he is not sure if patient is out of medication. Please advise.      Current Outpatient Medications:   • Amphetamine-Dextroamphet ER (ADDERALL XR) 15 MG Oral Capsule SR 24 Hr

## 2020-01-21 RX ORDER — DEXTROAMPHETAMINE SACCHARATE, AMPHETAMINE ASPARTATE MONOHYDRATE, DEXTROAMPHETAMINE SULFATE AND AMPHETAMINE SULFATE 3.75; 3.75; 3.75; 3.75 MG/1; MG/1; MG/1; MG/1
15 CAPSULE, EXTENDED RELEASE ORAL EVERY MORNING
Qty: 30 CAPSULE | Refills: 0 | Status: SHIPPED | OUTPATIENT
Start: 2020-01-21 | End: 2020-01-28

## 2020-01-28 NOTE — PROGRESS NOTES
Gregorio Shoemaker is a 30year old female. HPI:   1. Attention deficit hyperactivity disorder (ADHD), predominantly inattentive type    Has been taking medicine as advised. Still with trouble with completing tasks and organizational  issues.  Her concentration d rashes  RESPIRATORY: denies shortness of breath with exertion  CARDIOVASCULAR: denies chest pain on exertion  GI: denies abdominal pain and denies heartburn  NEURO: denies headaches    EXAM:   /74 (BP Location: Right arm, Patient Position: Sitting, C

## 2020-05-26 ENCOUNTER — TELEPHONE (OUTPATIENT)
Dept: INTERNAL MEDICINE CLINIC | Facility: CLINIC | Age: 35
End: 2020-05-26

## 2020-05-26 RX ORDER — DEXTROAMPHETAMINE SACCHARATE, AMPHETAMINE ASPARTATE MONOHYDRATE, DEXTROAMPHETAMINE SULFATE AND AMPHETAMINE SULFATE 3.75; 3.75; 3.75; 3.75 MG/1; MG/1; MG/1; MG/1
15 CAPSULE, EXTENDED RELEASE ORAL EVERY MORNING
Qty: 30 CAPSULE | Refills: 0 | Status: SHIPPED | OUTPATIENT
Start: 2020-05-26 | End: 2020-07-21

## 2020-05-26 NOTE — TELEPHONE ENCOUNTER
Patient called, requesting refill of the following prescription.     Amphetamine-Dextroamphet ER (ADDERALL XR) 15 MG Oral Capsule SR 24 Hr

## 2020-07-21 RX ORDER — DEXTROAMPHETAMINE SACCHARATE, AMPHETAMINE ASPARTATE MONOHYDRATE, DEXTROAMPHETAMINE SULFATE AND AMPHETAMINE SULFATE 3.75; 3.75; 3.75; 3.75 MG/1; MG/1; MG/1; MG/1
15 CAPSULE, EXTENDED RELEASE ORAL EVERY MORNING
Qty: 30 CAPSULE | Refills: 0 | Status: SHIPPED | OUTPATIENT
Start: 2020-07-21 | End: 2020-08-27

## 2020-07-21 NOTE — TELEPHONE ENCOUNTER
Patient called, requesting a refill for the following medication.     Amphetamine-Dextroamphet ER (ADDERALL XR) 15 MG Oral Capsule SR 24 Hr

## 2020-08-27 RX ORDER — DEXTROAMPHETAMINE SACCHARATE, AMPHETAMINE ASPARTATE MONOHYDRATE, DEXTROAMPHETAMINE SULFATE AND AMPHETAMINE SULFATE 3.75; 3.75; 3.75; 3.75 MG/1; MG/1; MG/1; MG/1
15 CAPSULE, EXTENDED RELEASE ORAL EVERY MORNING
Qty: 30 CAPSULE | Refills: 0 | Status: SHIPPED | OUTPATIENT
Start: 2020-08-27 | End: 2020-09-02

## 2020-08-27 NOTE — TELEPHONE ENCOUNTER
Patient is requesting a refill for adderall. Please advise.      Amphetamine-Dextroamphet ER (ADDERALL XR) 15 MG Oral Capsule SR 24 Hr

## 2020-09-02 RX ORDER — DEXTROAMPHETAMINE SACCHARATE, AMPHETAMINE ASPARTATE MONOHYDRATE, DEXTROAMPHETAMINE SULFATE AND AMPHETAMINE SULFATE 3.75; 3.75; 3.75; 3.75 MG/1; MG/1; MG/1; MG/1
15 CAPSULE, EXTENDED RELEASE ORAL EVERY MORNING
Qty: 30 CAPSULE | Refills: 0 | Status: SHIPPED | OUTPATIENT
Start: 2020-09-02 | End: 2020-11-11

## 2020-09-30 RX ORDER — DEXTROAMPHETAMINE SACCHARATE, AMPHETAMINE ASPARTATE MONOHYDRATE, DEXTROAMPHETAMINE SULFATE AND AMPHETAMINE SULFATE 3.75; 3.75; 3.75; 3.75 MG/1; MG/1; MG/1; MG/1
15 CAPSULE, EXTENDED RELEASE ORAL EVERY MORNING
Qty: 30 CAPSULE | Refills: 0 | Status: SHIPPED | OUTPATIENT
Start: 2020-09-30 | End: 2020-10-05

## 2020-09-30 NOTE — TELEPHONE ENCOUNTER
Patient called in requesting refill on medication below. She states that she has about 2 pills left. Confirmed pharmacy on encounter. Please advise.      Current Outpatient Medications:   •  Amphetamine-Dextroamphet ER (ADDERALL XR) 15 MG Oral Capsu

## 2020-10-05 RX ORDER — DEXTROAMPHETAMINE SACCHARATE, AMPHETAMINE ASPARTATE MONOHYDRATE, DEXTROAMPHETAMINE SULFATE AND AMPHETAMINE SULFATE 3.75; 3.75; 3.75; 3.75 MG/1; MG/1; MG/1; MG/1
15 CAPSULE, EXTENDED RELEASE ORAL EVERY MORNING
Qty: 30 CAPSULE | Refills: 0 | Status: SHIPPED | OUTPATIENT
Start: 2020-10-05 | End: 2020-11-04

## 2020-10-05 NOTE — TELEPHONE ENCOUNTER
Pharmacy verified that patient picked up last script 9-3-2020. They have not received any new orders.      Jacqueline De Leon-  I have re-pended Adderall for your approval.  Thanks

## 2020-11-10 NOTE — TELEPHONE ENCOUNTER
Patient is requesting a refill for adderall. Please sent script to [facility] pharmacy. Patient is out of medication. Please advise.      Amphetamine-Dextroamphet ER (ADDERALL XR) 15 MG Oral Capsule SR 24 Hr

## 2020-11-11 RX ORDER — DEXTROAMPHETAMINE SACCHARATE, AMPHETAMINE ASPARTATE MONOHYDRATE, DEXTROAMPHETAMINE SULFATE AND AMPHETAMINE SULFATE 3.75; 3.75; 3.75; 3.75 MG/1; MG/1; MG/1; MG/1
15 CAPSULE, EXTENDED RELEASE ORAL EVERY MORNING
Qty: 30 CAPSULE | Refills: 0 | Status: SHIPPED | OUTPATIENT
Start: 2020-11-11 | End: 2020-12-11

## 2020-12-18 NOTE — TELEPHONE ENCOUNTER
Patient is requesting a refill, send to [facility] in [facility].   Amphetamine-Dextroamphet ER (ADDERALL XR) 15 MG Oral Capsule SR 24 Hr

## 2020-12-21 RX ORDER — DEXTROAMPHETAMINE SACCHARATE, AMPHETAMINE ASPARTATE MONOHYDRATE, DEXTROAMPHETAMINE SULFATE AND AMPHETAMINE SULFATE 3.75; 3.75; 3.75; 3.75 MG/1; MG/1; MG/1; MG/1
15 CAPSULE, EXTENDED RELEASE ORAL EVERY MORNING
Qty: 30 CAPSULE | Refills: 0 | Status: SHIPPED | OUTPATIENT
Start: 2020-12-21 | End: 2021-02-17

## 2021-01-23 DIAGNOSIS — Z76.0 MEDICATION REFILL: ICD-10-CM

## 2021-01-23 RX ORDER — NORGESTREL AND ETHINYL ESTRADIOL 0.3-0.03MG
KIT ORAL
Qty: 84 TABLET | Refills: 0 | OUTPATIENT
Start: 2021-01-23

## 2021-01-26 ENCOUNTER — TELEPHONE (OUTPATIENT)
Dept: OBGYN CLINIC | Facility: CLINIC | Age: 36
End: 2021-01-26

## 2021-01-26 DIAGNOSIS — Z76.0 MEDICATION REFILL: ICD-10-CM

## 2021-01-26 NOTE — TELEPHONE ENCOUNTER
Last annual - 11/14/2019  Last pap - 11/14/2019, normal, neg hpv  Pt has annual - 2/17/2021    LMTCB to see how long ago pt ran out of med.

## 2021-01-26 NOTE — TELEPHONE ENCOUNTER
Patient is requesting her birth control refill. Patient states she was told by her pharmacy that order was not approved. Patient has a physical scheduled for 02/17/21. Patient would like medication to be sent out today if possible since she is currently out of medication. Patient would like a call back if not patient said a detailed message is ok.

## 2021-01-27 RX ORDER — NORGESTREL AND ETHINYL ESTRADIOL 0.3-0.03MG
1 KIT ORAL DAILY
Qty: 1 PACKAGE | Refills: 0 | Status: SHIPPED | OUTPATIENT
Start: 2021-01-27 | End: 2021-02-17

## 2021-01-27 NOTE — TELEPHONE ENCOUNTER
Pt informed that 1 pack of OCP was sent to pharmacy. Pt informed she will need to wait to start new pack with next cycle since she has been off pill since Sunday. Pt verbalized understanding. Pt advised she do first day start or Sunday start.

## 2021-02-09 NOTE — TELEPHONE ENCOUNTER
Patient is requesting a refill. Current Outpatient Medications   Medication Sig Dispense Refill   • Amphetamine-Dextroamphet ER 15 MG Oral Capsule SR 24 Hr Take 1 capsule (15 mg total) by mouth every morning.  30 capsule 0

## 2021-02-10 RX ORDER — DEXTROAMPHETAMINE SACCHARATE, AMPHETAMINE ASPARTATE MONOHYDRATE, DEXTROAMPHETAMINE SULFATE AND AMPHETAMINE SULFATE 3.75; 3.75; 3.75; 3.75 MG/1; MG/1; MG/1; MG/1
15 CAPSULE, EXTENDED RELEASE ORAL DAILY
Qty: 30 CAPSULE | Refills: 0 | OUTPATIENT
Start: 2021-04-12 | End: 2021-05-12

## 2021-02-10 RX ORDER — DEXTROAMPHETAMINE SACCHARATE, AMPHETAMINE ASPARTATE MONOHYDRATE, DEXTROAMPHETAMINE SULFATE AND AMPHETAMINE SULFATE 3.75; 3.75; 3.75; 3.75 MG/1; MG/1; MG/1; MG/1
15 CAPSULE, EXTENDED RELEASE ORAL DAILY
Qty: 30 CAPSULE | Refills: 0 | Status: SHIPPED | OUTPATIENT
Start: 2021-02-10 | End: 2021-03-12

## 2021-02-10 RX ORDER — DEXTROAMPHETAMINE SACCHARATE, AMPHETAMINE ASPARTATE MONOHYDRATE, DEXTROAMPHETAMINE SULFATE AND AMPHETAMINE SULFATE 3.75; 3.75; 3.75; 3.75 MG/1; MG/1; MG/1; MG/1
15 CAPSULE, EXTENDED RELEASE ORAL DAILY
Qty: 30 CAPSULE | Refills: 0 | OUTPATIENT
Start: 2021-03-12 | End: 2021-04-11

## 2021-02-17 ENCOUNTER — OFFICE VISIT (OUTPATIENT)
Dept: OBGYN CLINIC | Facility: CLINIC | Age: 36
End: 2021-02-17
Payer: COMMERCIAL

## 2021-02-17 VITALS
HEART RATE: 83 BPM | SYSTOLIC BLOOD PRESSURE: 120 MMHG | WEIGHT: 159.81 LBS | BODY MASS INDEX: 24 KG/M2 | DIASTOLIC BLOOD PRESSURE: 74 MMHG

## 2021-02-17 DIAGNOSIS — Z01.419 WELL WOMAN EXAM WITH ROUTINE GYNECOLOGICAL EXAM: Primary | ICD-10-CM

## 2021-02-17 DIAGNOSIS — Z76.0 MEDICATION REFILL: ICD-10-CM

## 2021-02-17 PROCEDURE — 3074F SYST BP LT 130 MM HG: CPT | Performed by: CLINICAL NURSE SPECIALIST

## 2021-02-17 PROCEDURE — 99395 PREV VISIT EST AGE 18-39: CPT | Performed by: CLINICAL NURSE SPECIALIST

## 2021-02-17 PROCEDURE — 3078F DIAST BP <80 MM HG: CPT | Performed by: CLINICAL NURSE SPECIALIST

## 2021-02-17 RX ORDER — SERTRALINE HYDROCHLORIDE 100 MG/1
TABLET, FILM COATED ORAL
COMMUNITY
Start: 2020-09-01

## 2021-02-17 RX ORDER — NORGESTREL AND ETHINYL ESTRADIOL 0.3-0.03MG
1 KIT ORAL DAILY
Qty: 3 PACKAGE | Refills: 4 | Status: SHIPPED | OUTPATIENT
Start: 2021-02-17

## 2021-02-18 NOTE — PROGRESS NOTES
Viral Shannon is a 37year old female [facility] Patient's last menstrual period was 02/14/2021. Patient presents with:  Gyn Exam: ANNUAL EXAM   Medication Request: OCP  Last annual exam and Pap smear was 2019. Pap smear was normal HPV negative.   Periods are m Lifestyle      Physical activity        Days per week: Not on file        Minutes per session: Not on file      Stress: Not on file    Relationships      Social connections        Talks on phone: Not on file        Gets together: Not on file        Attends as needed for Anxiety. , Disp: 30 tablet, Rfl: 0    ALLERGIES:    Cefaclor                    Comment:Other reaction(s): Unknown      Review of Systems:  Constitutional:  Denies fatigue, night sweats, hot flashes  Eyes:  denies blurred or double vision  Car tenderness  Adnexa: normal without masses or tenderness  Perineum: normal  Anus: no hemorroids     Assessment & Plan:  Terri Munoz was seen today for gyn exam and medication request.    Diagnoses and all orders for this visit:    Well woman exam with routine gyne

## 2021-03-22 NOTE — PROGRESS NOTES
Patient ID: George Baptiste is a 37year old female. No chief complaint on file. HISTORY OF PRESENT ILLNESS:   Patient presents for above. This visit is conducted using Telemedicine with live, interactive video and audio.     Review of Systems   MEDIC Helmet: Not Asked        Seat Belt: Not Asked        Self-Exams: Not Asked    Social History Narrative      Not on file    Social Determinants of Health  Financial Resource Strain:       Difficulty of Paying Living Expenses:   Food Insecurity:       Olvin Davies spent on encounter  14 minutes   Video time 11 minutes   Documentation time 3 minutes     Sheila Vazquez understands video evaluation is not a substitute for face-to-face examination or emergency care.  Patient advised to go to ER or call 911 for worsening sym

## 2022-06-21 ENCOUNTER — TELEPHONE (OUTPATIENT)
Dept: INTERNAL MEDICINE CLINIC | Facility: CLINIC | Age: 37
End: 2022-06-21

## 2023-04-20 NOTE — TELEPHONE ENCOUNTER
Delivery system removed. Continued w/AVN ablation setup Lm stating message was sent to the doctor on call to address refill request. We will call pt as soon as we have a response. See 10/9 refill encounter.